# Patient Record
Sex: FEMALE | Race: WHITE | NOT HISPANIC OR LATINO | URBAN - METROPOLITAN AREA
[De-identification: names, ages, dates, MRNs, and addresses within clinical notes are randomized per-mention and may not be internally consistent; named-entity substitution may affect disease eponyms.]

---

## 2019-02-28 ENCOUNTER — OFFICE VISIT (OUTPATIENT)
Dept: OBSTETRICS AND GYNECOLOGY | Facility: CLINIC | Age: 24
End: 2019-02-28
Payer: COMMERCIAL

## 2019-02-28 VITALS
BODY MASS INDEX: 35.7 KG/M2 | HEIGHT: 62 IN | SYSTOLIC BLOOD PRESSURE: 146 MMHG | WEIGHT: 194 LBS | DIASTOLIC BLOOD PRESSURE: 80 MMHG

## 2019-02-28 DIAGNOSIS — Z01.419 WELL WOMAN EXAM WITH ROUTINE GYNECOLOGICAL EXAM: Primary | ICD-10-CM

## 2019-02-28 PROCEDURE — 99385 PREV VISIT NEW AGE 18-39: CPT | Performed by: OBSTETRICS & GYNECOLOGY

## 2019-02-28 RX ORDER — NORGESTIMATE AND ETHINYL ESTRADIOL 0.25-0.035
1 KIT ORAL DAILY
Qty: 84 TABLET | Refills: 0 | Status: SHIPPED | OUTPATIENT
Start: 2019-02-28 | End: 2019-04-10 | Stop reason: ALTCHOICE

## 2019-02-28 RX ORDER — ALPRAZOLAM 0.25 MG/1
0.25 TABLET ORAL
COMMUNITY
Start: 2017-11-27

## 2019-02-28 RX ORDER — OMEPRAZOLE 40 MG/1
40 CAPSULE, DELAYED RELEASE ORAL DAILY
COMMUNITY

## 2019-02-28 RX ORDER — BUDESONIDE 3 MG/1
9 CAPSULE, COATED PELLETS ORAL DAILY
COMMUNITY
Start: 2019-02-26

## 2019-02-28 RX ORDER — SERTRALINE HYDROCHLORIDE 100 MG/1
TABLET, FILM COATED ORAL
COMMUNITY
Start: 2018-09-17

## 2019-02-28 RX ORDER — NORGESTIMATE AND ETHINYL ESTRADIOL 0.25-0.035
1 KIT ORAL
Refills: 1 | COMMUNITY
Start: 2019-02-13 | End: 2019-04-10 | Stop reason: SDUPTHER

## 2019-02-28 NOTE — PROGRESS NOTES
2019  Ana Hays is a 23 y.o. female who presents for annual exam.      Recently diagnosed with Crohn's disease.      Lives with BF  Work in hospice, bereavement coordinator  + Exercise     Gyn History:    Patient's last menstrual period was 2019.  Menses monthly on sprintec, lasts 4 days, light flow, no intermenstrual spotting, mild cramps  No fibroids/ cysts  No STIs  Remote hx of abn pap, follow up WNL  Sexually active with BF- no pain/ bleeding  Contraception- Sprintec, no condoms  Gardasil- got 3 shots       OB History:   Obstetric History       T0      L0     SAB0   TAB0   Ectopic0   Multiple0   Live Births0           Medical History:   Past Medical History:   Diagnosis Date   • Anxiety    • Crohn's colitis (CMS/HCC) ([facility])    • Depression        Surgical History:   Past Surgical History:   Procedure Laterality Date   • LASIK     • WISDOM TOOTH EXTRACTION         Allergies: Patient has no known allergies.    Prior to Admission medications    Medication Sig Start Date End Date Taking? Authorizing Provider   ALPRAZolam (XANAX) 0.25 mg tablet Take 0.25 mg by mouth. 17  Yes Oly Clayton MD   budesonide EC (ENTOCORT EC) 3 mg 24 hr capsule Take 9 mg by mouth daily. 19  Yes Oly Clayton MD   norgestimate-ethinyl estradiol (SPRINTEC, 28, ORAL) Take by mouth.   Yes Oly Clayton MD   sertraline (ZOLOFT) 100 mg tablet Take 1/2 tablet for 2 weeks then increase to 1 tablet daily. 18  Yes Oly Clayton MD   omeprazole (PriLOSEC) 40 mg capsule Take 40 mg by mouth daily.    Provider, Historical, MD   SPRINTEC, 28, 0.25-35 mg-mcg per tablet Take 1 tablet by mouth once daily. 19   Oly Clayton MD        Social History:   Social History     Social History   • Marital status: Single     Spouse name: N/A   • Number of children: N/A   • Years of education: N/A     Social History Main Topics   • Smoking status: Never Smoker   • Smokeless  "tobacco: Never Used   • Alcohol use No   • Drug use: Yes     Types: Marijuana      Comment: plans on getting a prescription, weekends    • Sexual activity: Not Asked     Other Topics Concern   • None     Social History Narrative   • None        Family History:   Family History   Problem Relation Age of Onset   • Crohn's disease Father    • Colon cancer Neg Hx    • Breast cancer Neg Hx    • Ovarian cancer Neg Hx        Review of Systems and Physical Exam  The following have been reviewed and updated as appropriate in this visit: Tobacco  Med Hx  Surg Hx  Fam Hx  Soc Hx      BP (!) 146/80   Ht 1.575 m (5' 2\")   Wt 88 kg (194 lb)   LMP 02/12/2019   BMI 35.48 kg/m²   HPI  Review of Systems  Physical Exam   Constitutional: She is oriented to person, place, and time. She appears well-developed and well-nourished.   Genitourinary: Vagina normal and uterus normal.   External female genitalia normal.   Urethral meatus normal.   Urethra normal.   Normal bladder.   Vagina normal.   Cervix exam normal.  Uterus is normal. Uterine contour is regular.   Adnexa normal.   Neck: Normal range of motion. Neck supple. No thyromegaly present.   Cardiovascular: Normal rate, regular rhythm and normal heart sounds.    Pulmonary/Chest: Effort normal and breath sounds normal. No respiratory distress. She has no wheezes. She has no rales. Right breast exhibits no inverted nipple, no mass, no nipple discharge, no skin change and no tenderness. Left breast exhibits no inverted nipple, no mass, no nipple discharge, no skin change and no tenderness.   Abdominal: Soft. She exhibits no distension and no mass. There is no tenderness. There is no rebound.   Neurological: She is alert and oriented to person, place, and time.   Psychiatric: She has a normal mood and affect. Her behavior is normal. Thought content normal.       Diagnoses and all orders for this visit:    Well woman exam with routine gynecological exam  -     PAP W/REFLEX HR HPV & " CT/NG/TV; Future  Normal exam  Got gardasil  BP elevated- never diagnosed with hypertension.  Going to GI office tomorrow, will send over BP information at that time.      Will provide 3 months of OCPs and then a BP check.  If persistently elevated would recommend an alternative contraceptive method.      Concerned re: new diagnosis of Crohns with fertility and pregnancy- information provided.     Other orders  -     norgestimate-ethinyl estradiol (SPRINTEC, 28,) 0.25-35 mg-mcg per tablet; Take 1 tablet by mouth daily.    Le Travis MD

## 2019-03-04 LAB
C TRACH RRNA CVX QL NAA+PROBE: POSITIVE
CYTOLOGIST CVX/VAG CYTO: ABNORMAL
CYTOLOGY CVX/VAG DOC THIN PREP: ABNORMAL
DX ICD CODE: ABNORMAL
LAB CORP .: ABNORMAL
LAB CORP NOTE:: ABNORMAL
N GONORRHOEA RRNA CVX QL NAA+PROBE: NEGATIVE
OTHER STN SPEC: ABNORMAL
PATH REPORT.FINAL DX SPEC: ABNORMAL
STAT OF ADQ CVX/VAG CYTO-IMP: ABNORMAL
T VAGINALIS RRNA SPEC QL NAA+PROBE: NEGATIVE

## 2019-03-05 ENCOUNTER — TELEPHONE (OUTPATIENT)
Dept: OBSTETRICS AND GYNECOLOGY | Facility: CLINIC | Age: 24
End: 2019-03-05

## 2019-03-05 RX ORDER — AZITHROMYCIN 500 MG/1
1000 TABLET, FILM COATED ORAL DAILY
Qty: 2 TABLET | Refills: 0 | Status: SHIPPED | OUTPATIENT
Start: 2019-03-05 | End: 2019-03-06

## 2019-03-05 NOTE — TELEPHONE ENCOUNTER
Called patient.  + chlamydia on pap  Treatment called in- azithromycin.  Offered expedited treatment for partner, he will call the office and will send in RX to his pharmacy.  She has recently been screened for HIV/syphilis due to Crohn's disease.      She will represent for BP check and retesting in 2-3 months.

## 2019-03-07 NOTE — TELEPHONE ENCOUNTER
Expedited chlamydia treatment called in for TAMMY Laguna.  Recommended comprehensive STI screening and consistent condom use.

## 2019-03-13 ENCOUNTER — TELEPHONE (OUTPATIENT)
Dept: OBSTETRICS AND GYNECOLOGY | Facility: CLINIC | Age: 24
End: 2019-03-13

## 2019-04-10 ENCOUNTER — TELEPHONE (OUTPATIENT)
Dept: OBSTETRICS AND GYNECOLOGY | Facility: CLINIC | Age: 24
End: 2019-04-10

## 2019-04-10 RX ORDER — NORGESTIMATE AND ETHINYL ESTRADIOL 0.25-0.035
1 KIT ORAL
Qty: 28 TABLET | Refills: 0 | Status: SHIPPED | OUTPATIENT
Start: 2019-04-10 | End: 2019-05-02 | Stop reason: SDUPTHER

## 2019-05-02 ENCOUNTER — OFFICE VISIT (OUTPATIENT)
Dept: OBSTETRICS AND GYNECOLOGY | Facility: CLINIC | Age: 24
End: 2019-05-02
Payer: COMMERCIAL

## 2019-05-02 VITALS — SYSTOLIC BLOOD PRESSURE: 122 MMHG | DIASTOLIC BLOOD PRESSURE: 70 MMHG

## 2019-05-02 DIAGNOSIS — Z30.41 ENCOUNTER FOR SURVEILLANCE OF CONTRACEPTIVE PILLS: ICD-10-CM

## 2019-05-02 DIAGNOSIS — Z11.3 ROUTINE SCREENING FOR STI (SEXUALLY TRANSMITTED INFECTION): Primary | ICD-10-CM

## 2019-05-02 PROCEDURE — 99213 OFFICE O/P EST LOW 20 MIN: CPT | Performed by: OBSTETRICS & GYNECOLOGY

## 2019-05-02 RX ORDER — NORGESTIMATE AND ETHINYL ESTRADIOL 0.25-0.035
1 KIT ORAL
Qty: 84 TABLET | Refills: 3 | Status: SHIPPED | OUTPATIENT
Start: 2019-05-02 | End: 2020-05-12 | Stop reason: SDUPTHER

## 2019-05-02 NOTE — Clinical Note
*Do not use*   This records is only a placeholder for [employer] Timed Results Release setup for use on the Result Batch Letters Setup II screen in System Definitions

## 2019-05-02 NOTE — PROGRESS NOTES
Patient ID: Ana Hays   : 1995  MRN: [id number]   Visit Date: 2019    Subjective   Ana Hays is presenting today for Follow-up  On sprintec- had an elvated BP in the office during annual examination- 146/80.  Here for f/u BP check     Has Crohn's- starting on Humira tomorrow  Was diagnosed with Chlamydia 2019 s/p treatment.  Will rescreen.   Vital Signs for this encounter: /70     Obstetric History:   OB History    Para Term  AB Living   0 0 0 0 0 0   SAB TAB Ectopic Multiple Live Births   0 0 0 0 0           Past Medical History:  has a past medical history of Anxiety; C. difficile colitis; Crohn's colitis (CMS/HCC) (HCC); and Depression.  Past Surgical History:  has a past surgical history that includes LASIK and [place] tooth extraction.  Family History: family history includes Crohn's disease in her father.  Social History:  reports that she has never smoked. She has never used smokeless tobacco. She reports that she uses drugs, including Marijuana. She reports that she does not drink alcohol.  Medications:   Current Outpatient Prescriptions:   •  ALPRAZolam (XANAX) 0.25 mg tablet, Take 0.25 mg by mouth., Disp: , Rfl:   •  budesonide EC (ENTOCORT EC) 3 mg 24 hr capsule, Take 9 mg by mouth daily., Disp: , Rfl:   •  omeprazole (PriLOSEC) 40 mg capsule, Take 40 mg by mouth daily., Disp: , Rfl:   •  sertraline (ZOLOFT) 100 mg tablet, Take 1/2 tablet for 2 weeks then increase to 1 tablet daily., Disp: , Rfl:   •  SPRINTEC, 28, 0.25-35 mg-mcg per tablet, Take 1 tablet by mouth once daily., Disp: 84 tablet, Rfl: 3    Allergies: has No Known Allergies.     HPI  Review of Systems  Physical Exam   Constitutional: She appears well-developed and well-nourished.   Genitourinary:   External female genitalia normal.   Urethral meatus normal.   Urethra normal.   Cardiovascular: Normal rate, regular rhythm and normal heart sounds.    Pulmonary/Chest: Effort normal and breath  sounds normal.   Abdominal: Soft. She exhibits no distension and no mass. There is no tenderness. There is no guarding.   Skin: No erythema.   Psychiatric: She has a normal mood and affect. Her behavior is normal.     Diagnoses and all orders for this visit:    Routine screening for STI (sexually transmitted infection) (Primary)  -     HIV 1,2 AB P24 AG; Future  -     Hepatitis C antibody; Future  -     RPR; Future  -     Chlamydia/GC RNA:ThinPrep/Urine/Swab    Encounter for surveillance of contraceptive pills    Other orders  -     SPRINTEC, 28, 0.25-35 mg-mcg per tablet; Take 1 tablet by mouth once daily.    Happy with OCPs.  Normotensive today.  No contraindications, RX provided.   Rescreen for GC/CT    Le Travis MD

## 2019-05-09 LAB
C TRACH RRNA SPEC QL NAA+PROBE: NEGATIVE
N GONORRHOEA RRNA SPEC QL NAA+PROBE: NEGATIVE

## 2020-05-12 ENCOUNTER — TELEPHONE (OUTPATIENT)
Dept: OBSTETRICS AND GYNECOLOGY | Facility: CLINIC | Age: 25
End: 2020-05-12

## 2020-05-12 RX ORDER — NORGESTIMATE AND ETHINYL ESTRADIOL 0.25-0.035
1 KIT ORAL
Qty: 84 TABLET | Refills: 0 | Status: SHIPPED | OUTPATIENT
Start: 2020-05-12 | End: 2020-07-02 | Stop reason: SDUPTHER

## 2020-07-02 ENCOUNTER — TELEPHONE (OUTPATIENT)
Dept: OBSTETRICS AND GYNECOLOGY | Facility: CLINIC | Age: 25
End: 2020-07-02

## 2020-07-02 RX ORDER — NORGESTIMATE AND ETHINYL ESTRADIOL 0.25-0.035
1 KIT ORAL
Qty: 28 TABLET | Refills: 1 | Status: SHIPPED | OUTPATIENT
Start: 2020-07-02

## 2020-10-15 ENCOUNTER — APPOINTMENT (RX ONLY)
Dept: URBAN - METROPOLITAN AREA CLINIC 374 | Facility: CLINIC | Age: 25
Setting detail: DERMATOLOGY
End: 2020-10-15

## 2020-10-15 DIAGNOSIS — L30.9 DERMATITIS, UNSPECIFIED: ICD-10-CM

## 2020-10-15 PROCEDURE — 11105 PUNCH BX SKIN EA SEP/ADDL: CPT

## 2020-10-15 PROCEDURE — ? PHOTO-DOCUMENTATION

## 2020-10-15 PROCEDURE — ? ORDER TESTS

## 2020-10-15 PROCEDURE — ? PRESCRIPTION MEDICATION MANAGEMENT

## 2020-10-15 PROCEDURE — ? PRESCRIPTION

## 2020-10-15 PROCEDURE — ? COUNSELING

## 2020-10-15 PROCEDURE — 11104 PUNCH BX SKIN SINGLE LESION: CPT

## 2020-10-15 PROCEDURE — ? BIOPSY BY PUNCH METHOD

## 2020-10-15 RX ORDER — CALCIPOTRIENE AND BETAMETHASONE DIPROPIONATE 50; .5 UG/G; MG/G
AEROSOL, FOAM TOPICAL
Qty: 1 | Refills: 3 | Status: ERX | COMMUNITY
Start: 2020-10-15

## 2020-10-15 RX ADMIN — CALCIPOTRIENE AND BETAMETHASONE DIPROPIONATE: 50; .5 AEROSOL, FOAM TOPICAL at 00:00

## 2020-10-15 ASSESSMENT — LOCATION SIMPLE DESCRIPTION DERM
LOCATION SIMPLE: LEFT BUTTOCK
LOCATION SIMPLE: ABDOMEN

## 2020-10-15 ASSESSMENT — LOCATION DETAILED DESCRIPTION DERM
LOCATION DETAILED: PERIUMBILICAL SKIN
LOCATION DETAILED: LEFT MEDIAL BUTTOCK
LOCATION DETAILED: UMBILICUS

## 2020-10-15 ASSESSMENT — LOCATION ZONE DERM: LOCATION ZONE: TRUNK

## 2020-10-15 NOTE — PROCEDURE: PRESCRIPTION MEDICATION MANAGEMENT
Detail Level: Zone
Initiate Treatment: Enstilar 0.005 %-0.064 % topical foam: Apply to affected area bid
Render In Strict Bullet Format?: No

## 2020-10-15 NOTE — PROCEDURE: BIOPSY BY PUNCH METHOD
Detail Level: Detailed
Was A Bandage Applied: Yes
Punch Size In Mm: 2.5
Biopsy Type: H and E
Anesthesia Type: 1% lidocaine with epinephrine
Anesthesia Volume In Cc (Will Not Render If 0): 0.5
Additional Anesthesia Volume In Cc (Will Not Render If 0): 0
Hemostasis: Pro QR topical powder
Epidermal Sutures: none, closed by secondary intention
Wound Care: Petrolatum
Dressing: bandage
Patient Will Remove Sutures At Home?: No
Lab: -[id number]
Consent: Written consent was obtained and risks were reviewed including but not limited to scarring, infection, bleeding, scabbing, incomplete removal, nerve damage and allergy to anesthesia.
Post-Care Instructions: I reviewed with the patient in detail post-care instructions. Patient is to keep the biopsy site dry overnight, and then apply vaseline twice daily until healed.
Home Suture Removal Text: Patient was provided a home suture removal kit and will remove their sutures at home.  If they have any questions or difficulties they will call the office.
Notification Instructions: Patient will be notified of biopsy results. However, patient instructed to call the office if not contacted within 2 weeks.
Billing Type: Third-Party Bill
Information: Selecting Yes will display possible errors in your note based on the variables you have selected. This validation is only offered as a suggestion for you. PLEASE NOTE THAT THE VALIDATION TEXT WILL BE REMOVED WHEN YOU FINALIZE YOUR NOTE. IF YOU WANT TO FAX A PRELIMINARY NOTE YOU WILL NEED TO TOGGLE THIS TO 'NO' IF YOU DO NOT WANT IT IN YOUR FAXED NOTE.

## 2020-10-15 NOTE — PROCEDURE: ORDER TESTS
Performing Laboratory: [id number]
Expected Date Of Service: 10/15/2020
Billing Type: Third-Party Bill
Bill For Surgical Tray: no

## 2020-10-15 NOTE — HPI: RASH
What Type Of Note Output Would You Prefer (Optional)?: Standard Output
How Severe Is Your Rash?: mild
Is This A New Presentation, Or A Follow-Up?: Rash
Additional History: Chrons diseases

## 2020-10-22 ENCOUNTER — RX ONLY (OUTPATIENT)
Age: 25
Setting detail: RX ONLY
End: 2020-10-22

## 2020-10-22 RX ORDER — AMOXICILLIN AND CLAVULANATE POTASSIUM 875; 125 MG/1; 1/1
1 TABLET, FILM COATED ORAL BID
Qty: 60 | Refills: 0 | Status: ERX | COMMUNITY
Start: 2020-10-22

## 2020-11-10 ENCOUNTER — APPOINTMENT (RX ONLY)
Dept: URBAN - METROPOLITAN AREA CLINIC 374 | Facility: CLINIC | Age: 25
Setting detail: DERMATOLOGY
End: 2020-11-10

## 2020-11-10 DIAGNOSIS — L40.0 PSORIASIS VULGARIS: ICD-10-CM | Status: IMPROVED

## 2020-11-10 PROCEDURE — ? OTHER

## 2020-11-10 PROCEDURE — ? LAB REPORTS REVIEWED

## 2020-11-10 PROCEDURE — ? PATHOLOGY DISCUSSION

## 2020-11-10 PROCEDURE — ? PRESCRIPTION MEDICATION MANAGEMENT

## 2020-11-10 PROCEDURE — 99213 OFFICE O/P EST LOW 20 MIN: CPT

## 2020-11-10 ASSESSMENT — LOCATION DETAILED DESCRIPTION DERM
LOCATION DETAILED: UMBILICUS
LOCATION DETAILED: GLUTEAL CLEFT
LOCATION DETAILED: RIGHT MEDIAL UPPER BACK

## 2020-11-10 ASSESSMENT — LOCATION SIMPLE DESCRIPTION DERM
LOCATION SIMPLE: ABDOMEN
LOCATION SIMPLE: RIGHT UPPER BACK
LOCATION SIMPLE: GLUTEAL CLEFT

## 2020-11-10 ASSESSMENT — LOCATION ZONE DERM: LOCATION ZONE: TRUNK

## 2020-11-10 NOTE — PROCEDURE: OTHER
Detail Level: Zone
Note Text (......Xxx Chief Complaint.): This diagnosis correlates with the
Other (Free Text): Patient is starting stelara injection this week from her GI doctor due to her Crohn’s disease.  Has d/neena humira due to psoriasis flare

## 2020-11-10 NOTE — PROCEDURE: PRESCRIPTION MEDICATION MANAGEMENT
Continue Regimen: Enstilar foam qhs to the psoriasis of the body
Detail Level: Zone
Render In Strict Bullet Format?: No
Discontinue Regimen: Augmentin tablets

## 2021-07-19 ENCOUNTER — RX ONLY (OUTPATIENT)
Age: 26
Setting detail: RX ONLY
End: 2021-07-19

## 2021-07-19 RX ORDER — CALCIPOTRIENE AND BETAMETHASONE DIPROPIONATE 50; .5 UG/G; MG/G
AEROSOL, FOAM TOPICAL
Qty: 1 | Refills: 3 | Status: ERX | COMMUNITY
Start: 2021-07-19